# Patient Record
Sex: FEMALE | Race: ASIAN
[De-identification: names, ages, dates, MRNs, and addresses within clinical notes are randomized per-mention and may not be internally consistent; named-entity substitution may affect disease eponyms.]

---

## 2020-05-16 ENCOUNTER — HOSPITAL ENCOUNTER (EMERGENCY)
Dept: HOSPITAL 26 - MED | Age: 25
Discharge: HOME | End: 2020-05-16
Payer: MEDICAID

## 2020-05-16 VITALS — WEIGHT: 136 LBS | BODY MASS INDEX: 21.35 KG/M2 | HEIGHT: 67 IN

## 2020-05-16 VITALS — SYSTOLIC BLOOD PRESSURE: 138 MMHG | DIASTOLIC BLOOD PRESSURE: 91 MMHG

## 2020-05-16 DIAGNOSIS — T40.1X1A: Primary | ICD-10-CM

## 2020-05-16 DIAGNOSIS — F15.129: ICD-10-CM
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
## 2020-05-16 DIAGNOSIS — F12.90: ICD-10-CM

## 2020-05-16 PROCEDURE — 99283 EMERGENCY DEPT VISIT LOW MDM: CPT

## 2020-05-16 NOTE — NUR
25 YO F BIBA FOR C/C OF OVERDOSE ON HEROIN AND METH. PER EMS REPORT PT WAS 
FOUND UNRESPOSIVE BUT WITH PULSE AND AGONAL BREATHING BY HER FRIEND. UNKNOWN 
HOW LONG PT WAS DOWN. PT CAME IN WITH 20 GUAGE IN RAC WITH NS RUNNING. 1MG OF 
NARCAN IM GIVEN IN ROUTE. PTS REGULAR RESPIRATIONS RETURNED AFTER 
ADMINISTRATION. PT PRESENTS TO ER WITH STABLE VSS AND REGULAR RESPIRATIONS AND 
100% O2 SAT ON ROOM AIR. PT IS NAUSEOUS WITH DRY HEAVING. ERMD AT BEDSIDE UPON 
ARRIVAL. PT PLACED ON CARDIAC MONITOR. BED LOCKED AND IN LOWEST POSITION. SIDE 
RAILS X2. 



NKA

NO MED HX

NO RX

## 2020-05-16 NOTE — NUR
OT TRANSPORTED TO BED 11 [facility]. 

-------------------------------------------------------------------------------

[facility]: [facility]

-------------------------------------------------------------------------------

PT TRANSPORTED TO BED 11 [facility].